# Patient Record
Sex: FEMALE
[De-identification: names, ages, dates, MRNs, and addresses within clinical notes are randomized per-mention and may not be internally consistent; named-entity substitution may affect disease eponyms.]

---

## 2020-10-22 PROBLEM — Z00.00 ENCOUNTER FOR PREVENTIVE HEALTH EXAMINATION: Status: ACTIVE | Noted: 2020-10-22

## 2020-10-24 PROBLEM — R92.8 ABNORMAL FINDING ON BREAST IMAGING: Status: ACTIVE | Noted: 2020-10-24

## 2020-10-26 ENCOUNTER — APPOINTMENT (OUTPATIENT)
Dept: SURGICAL ONCOLOGY | Facility: CLINIC | Age: 60
End: 2020-10-26
Payer: MEDICAID

## 2020-10-26 VITALS
HEIGHT: 59 IN | OXYGEN SATURATION: 98 % | DIASTOLIC BLOOD PRESSURE: 84 MMHG | HEART RATE: 74 BPM | SYSTOLIC BLOOD PRESSURE: 136 MMHG | BODY MASS INDEX: 25.2 KG/M2 | WEIGHT: 125 LBS

## 2020-10-26 DIAGNOSIS — Z80.3 FAMILY HISTORY OF MALIGNANT NEOPLASM OF BREAST: ICD-10-CM

## 2020-10-26 DIAGNOSIS — Z80.0 FAMILY HISTORY OF MALIGNANT NEOPLASM OF DIGESTIVE ORGANS: ICD-10-CM

## 2020-10-26 DIAGNOSIS — Z78.9 OTHER SPECIFIED HEALTH STATUS: ICD-10-CM

## 2020-10-26 DIAGNOSIS — R92.8 OTHER ABNORMAL AND INCONCLUSIVE FINDINGS ON DIAGNOSTIC IMAGING OF BREAST: ICD-10-CM

## 2020-10-26 PROCEDURE — 99072 ADDL SUPL MATRL&STAF TM PHE: CPT

## 2020-10-26 PROCEDURE — 99204 OFFICE O/P NEW MOD 45 MIN: CPT

## 2020-10-27 PROBLEM — Z78.9 PATIENT DENIES MEDICAL PROBLEMS: Status: RESOLVED | Noted: 2020-10-27 | Resolved: 2020-10-27

## 2020-10-27 PROBLEM — Z80.0 FAMILY HISTORY OF MALIGNANT NEOPLASM OF COLON: Status: ACTIVE | Noted: 2020-10-26

## 2020-10-27 PROBLEM — Z80.3 FAMILY HISTORY OF MALIGNANT NEOPLASM OF BREAST: Status: ACTIVE | Noted: 2020-10-26

## 2020-10-27 NOTE — HISTORY OF PRESENT ILLNESS
[de-identified] : Bhaskar is a pleasant 59 year-old female here for an initial consultation.  She completed a screening mammogram on 9/9/20, which revealed bilateral subcentimeter nodules for which spot compression views and a targeted ultrasound was recommended for further evaluation (BIRADS 0).  Subsequent diagnostic mammogram showed that both findings are not significantly changed dating back to prior study in 2015.  Findings correspond to a 4 mm nodule/probable complicated cyst in the right breast at 10:00, 2 cmfn, and a 5 mm nodule in the left breast at 9:00 on ultrasound.  A 6 month follow up bilateral ultrasound was recommended (BIRADS 3). \par \par She denies any breast symptoms or palpable masses.  She denies any nipple discharge.\par \par Her past medical history is unremarkable. Past surgical history is notable for hemorrhoidectomy and tubal ligation.  She denies any current medications. She denies any drug allergies.  Her family history is notable for breast cancer involving her niece (age 38), and colon cancer involving her sister (age 52)

## 2020-10-27 NOTE — ASSESSMENT
[FreeTextEntry1] : We discussed options including ongoing surveillance and tissue sampling. She agrees with short interval surveillance with a repeat bilateral breast ultrasound to be completed in 6 months. I've asked her to followup with me shortly thereafter.

## 2020-10-27 NOTE — PHYSICAL EXAM
[FreeTextEntry1] : Medical staff ( MB  ) present during exam\par  [Normal] : normal breast inspection and palpation of axillas [Normal Neck Lymph Nodes] : normal neck lymph nodes  [Normal Supraclavicular Lymph Nodes] : normal supraclavicular lymph nodes [Normal Groin Lymph Nodes] : normal groin lymph nodes [Normal Axillary Lymph Nodes] : normal axillary lymph nodes [Normal] : oriented to person, place and time, with appropriate affect

## 2020-10-27 NOTE — CONSULT LETTER
[Dear  ___] : Dear  [unfilled], [Consult Letter:] : I had the pleasure of evaluating your patient, [unfilled]. [Please see my note below.] : Please see my note below. [Consult Closing:] : Thank you very much for allowing me to participate in the care of this patient.  If you have any questions, please do not hesitate to contact me. [Sincerely,] : Sincerely, [FreeTextEntry2] : Zully Gottlieb MD [FreeTextEntry1] : Bhaskar is a pleasant 59 year-old female here for an initial consultation.  She completed a screening mammogram on 9/9/20, which revealed bilateral subcentimeter nodules for which spot compression views and a targeted ultrasound was recommended for further evaluation (BIRADS 0).  Subsequent diagnostic mammogram showed that both findings are not significantly changed dating back to prior study in 2015.  Findings correspond to a 4 mm nodule/probable complicated cyst in the right breast at 10:00, 2 cmfn, and a 5 mm nodule in the left breast at 9:00 on ultrasound.  A 6 month follow up bilateral ultrasound was recommended (BIRADS 3). \par \par She denies any breast symptoms or palpable masses.  She denies any nipple discharge.\par \par Her past medical history is unremarkable. Past surgical history is notable for hemorrhoidectomy and tubal ligation.  She denies any current medications. She denies any drug allergies.  Her family history is notable for breast cancer involving her niece (age 38), and colon cancer involving her sister (age 52)\par \par On exam, both breasts are without any dominant masses. There is no axillary adenopathy on either side.\par \par We discussed options including ongoing surveillance and tissue sampling. She agrees with short interval surveillance with a repeat bilateral breast ultrasound to be completed in 6 months. I've asked her to followup with me shortly thereafter. [FreeTextEntry3] : Reggie Castro MD\par Surgical Oncology\par [facility]\par Office: [phone]\par Cell: [phone]\par

## 2020-11-03 ENCOUNTER — NON-APPOINTMENT (OUTPATIENT)
Age: 60
End: 2020-11-03